# Patient Record
Sex: FEMALE | Race: BLACK OR AFRICAN AMERICAN
[De-identification: names, ages, dates, MRNs, and addresses within clinical notes are randomized per-mention and may not be internally consistent; named-entity substitution may affect disease eponyms.]

---

## 2017-06-08 ENCOUNTER — HOSPITAL ENCOUNTER (EMERGENCY)
Dept: HOSPITAL 74 - JERFT | Age: 47
Discharge: HOME | End: 2017-06-08
Payer: COMMERCIAL

## 2017-06-08 VITALS — TEMPERATURE: 97.9 F | SYSTOLIC BLOOD PRESSURE: 146 MMHG | HEART RATE: 63 BPM | DIASTOLIC BLOOD PRESSURE: 94 MMHG

## 2017-06-08 VITALS — BODY MASS INDEX: 32.8 KG/M2

## 2017-06-08 DIAGNOSIS — R00.1: ICD-10-CM

## 2017-06-08 DIAGNOSIS — M25.561: Primary | ICD-10-CM

## 2017-06-08 DIAGNOSIS — M25.461: ICD-10-CM

## 2017-06-08 PROCEDURE — 3E0233Z INTRODUCTION OF ANTI-INFLAMMATORY INTO MUSCLE, PERCUTANEOUS APPROACH: ICD-10-PCS

## 2017-06-08 NOTE — PDOC
History of Present Illness





- General


Chief Complaint: Pain, Acute


Stated Complaint: SWOLLEN RT LEG


Time Seen by Provider: 06/08/17 17:27


History Source: Patient


Exam Limitations: No Limitations





- History of Present Illness


Initial Comments: 


CHIEF COMPLAINT:  46 y/o female with PMH bradycardia c/o right knee pain and 

swelling. 





HISTORY OF PRESENT ILLNESS:  The patient states she has a known tear in her 

right knee (prior MRI verifies medial meniscus tear).  She states last weekend 

she was on her feet all weekend and then this week her knee has been swollen 

and painful.  She takes Ibuprofen but states it's not helping much.  She has a 

follow up with her Orthopedic doctor next friday, 6/16/17, but wants to know if 

there is anything she can do in the meantime.  She states she cannot take off 

of work and does heavy lifting and at least 50 squats per day at work.  She 

denies any new injury to the knee. 





Vital signs on arrival are within normal limits. 





REVIEW OF SYSTEMS:


GENERAL/CONSTITUTIONAL: No fever/chills.


HEAD, EYES, EARS, NOSE AND THROAT: No change in vision. No ear pain or 

discharge. No sore throat.


CARDIOVASCULAR: No chest pain or shortness of breath.


RESPIRATORY: No cough, wheezing, or hemoptysis.


EXTREMITIES:  +right knee pain and swelling. 


SKIN: No rash or easy bruising.


NEUROLOGIC: No headache, vertigo, loss of consciousness, or loss of sensation.








PHYSICAL EXAM:


VITAL_SIGNS: within normal limits


GENERAL_APPEARANCE: alert, cooperative, obvious discomfort with ambulation. 


MENTAL_STATUS: speech clear, oriented X 3, responds appropriately to questions.


NEURO: motor intact and sensory intact in injured extremity.


EXTREMITIES: Moderate joint effusion of right knee with both medial and lateral 

joint TTP.  Negative lachmann's test.  No erythema or streaking to affected 

joint.  


SKIN: warm, dry, good color.














Past History





- Past Medical History


Allergies/Adverse Reactions: 


 Allergies











Allergy/AdvReac Type Severity Reaction Status Date / Time


 


No Known Drug Allergies Allergy   Verified 06/08/17 17:27











Home Medications: 


Ambulatory Orders





Diazepam [Valium] 5 mg PO Q8H PRN #3 tablet MDD 3 11/12/16 


Naproxen [Naprosyn -] 500 mg PO BID PRN #14 tablet 11/12/16 








Anemia: Yes


Asthma: No


Cancer: No


Cardiac Disorders: Yes (bradycardia)


CVA: No


COPD: No


CHF: No


Dementia: No


Diabetes: No


GI Disorders: No


 Disorders: No


HTN: No


Hypercholesterolemia: No


Liver Disease: No


Seizures: No


Thyroid Disease: No





- Surgical History


Abdominal Surgery: No


Appendectomy: No


Cardiac Surgery: No


Cholecystectomy: No


Lung Surgery: No


Neurologic Surgery: No


Orthopedic Surgery: No (wisdom teeth extraction)





- Immunization History


Immunization Up to Date: Yes





- Psycho/Social/Smoking Cessation Hx


Anxiety: No


Suicidal Ideation: No


Smoking History: Never smoked


Have you smoked in the past 12 months: No


Hx Alcohol Use: No


Drug/Substance Use Hx: No


Substance Use Type: None


Hx Substance Use Treatment: No





Medical Decision Making





- Medical Decision Making


A/P:  46 y/o female with known right meniscus tear here with swelling and pain 

to right knee after a weekend of being on her feet.  I informed her that there 

is little she can do other than rest, elevate and ice the knee until the 

swelling comes down.  I offered her toradol which she accepted.   She states 

she cannot take off from work tomorrow but will elevate, ice and rest over the 

weekend.  I suggested she continue taking ibuprofen as well and keep follow up 

appointment scheduled with Dr. Carson next week.  Pt instructed to return to 

the ER with any worsening or concerning symptoms. 





The patient verbalizes understanding of all instructions, has no further 

questions and is awaiting discharge.














*DC/Admit/Observation/Transfer


Diagnosis at time of Disposition: 


 Knee effusion, right





Knee pain, right


Qualifiers:


 Chronicity: chronic Qualified Code(s): M25.561 - Pain in right knee





- Discharge Dispostion


Disposition: HOME


Condition at time of disposition: Good





- Referrals


Referrals: 


Samuel Gottlieb [Primary Care Provider] - 


Jamal Carson MD [Staff Physician] - 





- Patient Instructions


Printed Discharge Instructions:  DI for Knee Effusion, DI for Knee Pain, DI for 

Meniscal Tear, How To Perform RICE (Rest, Ice, Compress, Elevate)


Additional Instructions: 


Discharge Instructions:


-Elevate, ice and rest your knee until improved


-Take Ibuprofen for pain and swelling with food


-Keep follow up appointment with Dr. Carson scheduled for next Friday, 6/16/17


-Return to the ER with any worsening or concerning symptoms

## 2018-02-02 ENCOUNTER — HOSPITAL ENCOUNTER (EMERGENCY)
Dept: HOSPITAL 74 - JERFT | Age: 48
Discharge: HOME | End: 2018-02-02
Payer: COMMERCIAL

## 2018-02-02 VITALS — BODY MASS INDEX: 34.7 KG/M2

## 2018-02-02 VITALS — SYSTOLIC BLOOD PRESSURE: 143 MMHG | DIASTOLIC BLOOD PRESSURE: 90 MMHG | HEART RATE: 78 BPM | TEMPERATURE: 98.6 F

## 2018-02-02 DIAGNOSIS — S29.8XXA: Primary | ICD-10-CM
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
## 2018-02-02 DIAGNOSIS — S40.012A: ICD-10-CM

## 2018-02-02 DIAGNOSIS — M54.6: ICD-10-CM
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
